# Patient Record
Sex: MALE | Race: WHITE | NOT HISPANIC OR LATINO | Employment: FULL TIME | URBAN - NONMETROPOLITAN AREA
[De-identification: names, ages, dates, MRNs, and addresses within clinical notes are randomized per-mention and may not be internally consistent; named-entity substitution may affect disease eponyms.]

---

## 2021-05-03 ENCOUNTER — OFFICE VISIT (OUTPATIENT)
Dept: PODIATRY | Facility: CLINIC | Age: 21
End: 2021-05-03

## 2021-05-03 VITALS — OXYGEN SATURATION: 97 % | HEIGHT: 75 IN | HEART RATE: 66 BPM | BODY MASS INDEX: 38.54 KG/M2 | WEIGHT: 310 LBS

## 2021-05-03 DIAGNOSIS — L60.0 INGROWN TOENAIL: Primary | ICD-10-CM

## 2021-05-03 DIAGNOSIS — M79.675 PAIN OF TOE OF LEFT FOOT: ICD-10-CM

## 2021-05-03 DIAGNOSIS — L03.032 PARONYCHIA OF TOE OF LEFT FOOT: ICD-10-CM

## 2021-05-03 PROCEDURE — 11750 EXCISION NAIL&NAIL MATRIX: CPT | Performed by: PODIATRIST

## 2021-05-03 PROCEDURE — 99203 OFFICE O/P NEW LOW 30 MIN: CPT | Performed by: PODIATRIST

## 2021-05-03 NOTE — PROGRESS NOTES
"Wesley Mayer  2000  20 y.o. male     Patient came to clinic for concern of left hallux for possible ingrown. Patient states his pain is 6/10    05/03/2021  Chief Complaint   Patient presents with   • Left Foot - Ingrown Toenail           History of Present Illness    Wesley Mayer is a 20 y.o. male who presents for evaluation of left great toe pain.  He believes is due to an ingrowing toenail.  States this is a chronic issue that is been ongoing for about a year.  Feels it has been steadily worsening over the last 6 months.  He has been soaking the area and peroxide with minimal improvement.  He also notes that he stubbed his toe a few times in the past couple months which have some made his symptoms worse.      Past Medical History:   Diagnosis Date   • Ingrown toenail          History reviewed. No pertinent surgical history.      Family History   Problem Relation Age of Onset   • Diabetes Father    • Diabetes Paternal Grandmother    • Diabetes Paternal Grandfather          Social History     Socioeconomic History   • Marital status: Unknown     Spouse name: Not on file   • Number of children: Not on file   • Years of education: Not on file   • Highest education level: Not on file   Vaping Use   • Vaping Use: Never used   Substance and Sexual Activity   • Alcohol use: Defer   • Drug use: Defer   • Sexual activity: Defer         No current outpatient medications on file.     No current facility-administered medications for this visit.         OBJECTIVE    Pulse 66   Ht 190.5 cm (75\")   Wt (!) 141 kg (310 lb)   SpO2 97%   BMI 38.75 kg/m²       Review of Systems   Constitutional: Negative.    HENT: Negative.    Eyes: Negative.    Respiratory: Negative.    Cardiovascular: Negative.    Gastrointestinal: Negative.    Endocrine: Negative.    Genitourinary: Negative.    Musculoskeletal:        Foot pain    Skin: Negative.    Allergic/Immunologic: Negative.    Neurological: Negative.    "   Hematological: Negative.    Psychiatric/Behavioral: Negative.          Physical Exam   Constitutional: he appears well-developed and well-nourished.   CV: No chest pain. Normal RR  Resp: Non labored respirations  Psychiatric: he has a normal mood and affect. her behavior is normal.      Lower Extremity Exam:  Vascular: DP/PT pulses palpable 2+.   Moderate left hallux edema  Toes warm  Neuro: Protective sensation intact, b/l.  DTRs intact  Integument: No open wounds.  Ingrown medial and lateral nail border, left hallux. Mild erythema, edema.  Large granuloma to the medial nail fold +tenderness to palpation.  Web spaces c/d/i  No skin lesions  Musculoskeletal: LE muscle strength 5/5.   Gait normal  Ankle ROM full without pain or crepitus  STJ ROM full without pain or crepitus  no digital deformities      Nail Removal    Date/Time: 5/3/2021 9:41 AM  Performed by: Jair Akers DPM  Authorized by: Jair Akers DPM   Location: left foot  Location details: left big toe  Anesthesia: digital block    Anesthesia:  Local Anesthetic: lidocaine 2% without epinephrine  Anesthetic total: 3 mL  Preparation: skin prepped with Betadine  Amount removed: complete  Nail matrix removed: complete  Dressing: 4x4 and antibiotic ointment  Patient tolerance: patient tolerated the procedure well with no immediate complications  Comments: Matrixectomy with 10% NaOH. Neutralized with acetic acid.                  ASSESSMENT AND PLAN    Diagnoses and all orders for this visit:    1. Ingrown toenail (Primary)    2. Pain of toe of left foot    3. Paronychia of toe of left foot      -Comprehensive foot and ankle exam performed  -Diagnosis, prevention, and treatment of ingrown toe nails discussed with patient, including risks and potential benefits of nail avulsion both temporary and permanent versus simple debridement.  -Pt elected for  permanent avulsion of left hallux  -Aftercare instructions for soapy albarado soaks BID  -Recheck 2  weeks          This document has been electronically signed by Jair Akers DPM on May 3, 2021 09:41 CDT       Much of this encounter note is an electronic transcription/translation of spoken language to printed text.   Jair Akers DPM  5/3/2021  09:41 CDT

## 2021-05-27 ENCOUNTER — OFFICE VISIT (OUTPATIENT)
Dept: PODIATRY | Facility: CLINIC | Age: 21
End: 2021-05-27

## 2021-05-27 VITALS
WEIGHT: 310 LBS | HEART RATE: 82 BPM | DIASTOLIC BLOOD PRESSURE: 97 MMHG | BODY MASS INDEX: 38.54 KG/M2 | SYSTOLIC BLOOD PRESSURE: 149 MMHG | OXYGEN SATURATION: 98 % | HEIGHT: 75 IN

## 2021-05-27 DIAGNOSIS — L03.032 PARONYCHIA OF TOE OF LEFT FOOT: ICD-10-CM

## 2021-05-27 DIAGNOSIS — S91.209D NAIL AVULSION, TOE, SUBSEQUENT ENCOUNTER: Primary | ICD-10-CM

## 2021-05-27 PROCEDURE — 99212 OFFICE O/P EST SF 10 MIN: CPT | Performed by: PODIATRIST

## 2021-05-27 NOTE — PROGRESS NOTES
"Wesley Mayer  2000  20 y.o. male     Patient presents to clinic today for a recheck on left hallux nail avulsion.     05/27/2021    Chief Complaint   Patient presents with   • Left Foot - Follow-up, nail avulsion           History of Present Illness    Wesley Mayer is a 20 y.o. male who presents for f/u of left hallux nail avulsion. No new issues.    Past Medical History:   Diagnosis Date   • Ingrown toenail          History reviewed. No pertinent surgical history.      Family History   Problem Relation Age of Onset   • Diabetes Father    • Diabetes Paternal Grandmother    • Diabetes Paternal Grandfather          Social History     Socioeconomic History   • Marital status: Unknown     Spouse name: Not on file   • Number of children: Not on file   • Years of education: Not on file   • Highest education level: Not on file   Tobacco Use   • Smoking status: Never Smoker   • Smokeless tobacco: Never Used   Vaping Use   • Vaping Use: Never used   Substance and Sexual Activity   • Alcohol use: Defer   • Drug use: Defer   • Sexual activity: Defer         No current outpatient medications on file.     No current facility-administered medications for this visit.         OBJECTIVE    /97   Pulse 82   Ht 190.5 cm (75\")   Wt (!) 141 kg (310 lb)   SpO2 98%   BMI 38.75 kg/m²       Review of Systems   Constitutional: Negative.    HENT: Negative.    Eyes: Negative.    Respiratory: Negative.    Cardiovascular: Negative.    Gastrointestinal: Negative.    Endocrine: Negative.    Genitourinary: Negative.    Musculoskeletal:        Foot pain    Skin: Negative.    Allergic/Immunologic: Negative.    Neurological: Negative.    Hematological: Negative.    Psychiatric/Behavioral: Negative.          Physical Exam   Constitutional: he appears well-developed and well-nourished.   CV: No chest pain. Normal RR  Resp: Non labored respirations  Psychiatric: he has a normal mood and affect. her behavior is " normal.      Lower Extremity Exam:  Vascular: DP/PT pulses palpable 2+.   Minimal left hallux edema  Toes warm  Neuro: Protective sensation intact, b/l.  DTRs intact  Integument: No open wounds.  Left hallux nail bed with mild partial-thickness breakdown.  No signs of infection.  No active drainage.  Web spaces c/d/i  No skin lesions  Musculoskeletal: LE muscle strength 5/5.   Gait normal  Ankle ROM full without pain or crepitus  STJ ROM full without pain or crepitus  no digital deformities      Procedures          ASSESSMENT AND PLAN    Diagnoses and all orders for this visit:    1. Nail avulsion, toe, subsequent encounter (Primary)    2. Paronychia of toe of left foot      -Progressing well following nail procedure.  May wean soaks and bandaging over the next week  -Recheck as needed          This document has been electronically signed by Jair Akers DPM on May 27, 2021 09:35 CDT       Much of this encounter note is an electronic transcription/translation of spoken language to printed text.   Jair Akers DPM  5/27/2021  09:35 CDT